# Patient Record
Sex: MALE | Race: ASIAN | NOT HISPANIC OR LATINO | URBAN - METROPOLITAN AREA
[De-identification: names, ages, dates, MRNs, and addresses within clinical notes are randomized per-mention and may not be internally consistent; named-entity substitution may affect disease eponyms.]

---

## 2024-01-07 ENCOUNTER — INPATIENT (INPATIENT)
Facility: HOSPITAL | Age: 39
LOS: 0 days | Discharge: LEFT BEFORE DISCHARGE | End: 2024-01-08
Attending: STUDENT IN AN ORGANIZED HEALTH CARE EDUCATION/TRAINING PROGRAM | Admitting: STUDENT IN AN ORGANIZED HEALTH CARE EDUCATION/TRAINING PROGRAM
Payer: COMMERCIAL

## 2024-01-07 VITALS
WEIGHT: 147.05 LBS | RESPIRATION RATE: 17 BRPM | DIASTOLIC BLOOD PRESSURE: 68 MMHG | TEMPERATURE: 98 F | OXYGEN SATURATION: 96 % | SYSTOLIC BLOOD PRESSURE: 101 MMHG | HEIGHT: 65 IN | HEART RATE: 98 BPM

## 2024-01-07 PROCEDURE — 99285 EMERGENCY DEPT VISIT HI MDM: CPT | Mod: 25

## 2024-01-07 PROCEDURE — 93010 ELECTROCARDIOGRAM REPORT: CPT

## 2024-01-08 ENCOUNTER — EMERGENCY (EMERGENCY)
Facility: HOSPITAL | Age: 39
LOS: 1 days | Discharge: HOME | End: 2024-01-08
Attending: EMERGENCY MEDICINE
Payer: COMMERCIAL

## 2024-01-08 VITALS
OXYGEN SATURATION: 98 % | RESPIRATION RATE: 18 BRPM | DIASTOLIC BLOOD PRESSURE: 89 MMHG | SYSTOLIC BLOOD PRESSURE: 130 MMHG | TEMPERATURE: 99 F | HEART RATE: 87 BPM

## 2024-01-08 VITALS
HEART RATE: 89 BPM | TEMPERATURE: 99 F | DIASTOLIC BLOOD PRESSURE: 88 MMHG | OXYGEN SATURATION: 99 % | WEIGHT: 149.91 LBS | HEIGHT: 67 IN | SYSTOLIC BLOOD PRESSURE: 132 MMHG | RESPIRATION RATE: 18 BRPM

## 2024-01-08 DIAGNOSIS — E11.9 TYPE 2 DIABETES MELLITUS WITHOUT COMPLICATIONS: ICD-10-CM

## 2024-01-08 DIAGNOSIS — S22.39XA FRACTURE OF ONE RIB, UNSPECIFIED SIDE, INITIAL ENCOUNTER FOR CLOSED FRACTURE: ICD-10-CM

## 2024-01-08 DIAGNOSIS — R55 SYNCOPE AND COLLAPSE: ICD-10-CM

## 2024-01-08 DIAGNOSIS — J93.9 PNEUMOTHORAX, UNSPECIFIED: ICD-10-CM

## 2024-01-08 LAB
ALBUMIN SERPL ELPH-MCNC: 4.1 G/DL — SIGNIFICANT CHANGE UP (ref 3.3–5)
ALBUMIN SERPL ELPH-MCNC: 4.1 G/DL — SIGNIFICANT CHANGE UP (ref 3.3–5)
ALP SERPL-CCNC: 63 U/L — SIGNIFICANT CHANGE UP (ref 40–120)
ALP SERPL-CCNC: 63 U/L — SIGNIFICANT CHANGE UP (ref 40–120)
ALT FLD-CCNC: 29 U/L — SIGNIFICANT CHANGE UP (ref 12–78)
ALT FLD-CCNC: 29 U/L — SIGNIFICANT CHANGE UP (ref 12–78)
ANION GAP SERPL CALC-SCNC: 6 MMOL/L — SIGNIFICANT CHANGE UP (ref 5–17)
ANION GAP SERPL CALC-SCNC: 6 MMOL/L — SIGNIFICANT CHANGE UP (ref 5–17)
AST SERPL-CCNC: 25 U/L — SIGNIFICANT CHANGE UP (ref 15–37)
AST SERPL-CCNC: 25 U/L — SIGNIFICANT CHANGE UP (ref 15–37)
BASOPHILS # BLD AUTO: 0.06 K/UL — SIGNIFICANT CHANGE UP (ref 0–0.2)
BASOPHILS # BLD AUTO: 0.06 K/UL — SIGNIFICANT CHANGE UP (ref 0–0.2)
BASOPHILS NFR BLD AUTO: 0.5 % — SIGNIFICANT CHANGE UP (ref 0–2)
BASOPHILS NFR BLD AUTO: 0.5 % — SIGNIFICANT CHANGE UP (ref 0–2)
BILIRUB SERPL-MCNC: 0.3 MG/DL — SIGNIFICANT CHANGE UP (ref 0.2–1.2)
BILIRUB SERPL-MCNC: 0.3 MG/DL — SIGNIFICANT CHANGE UP (ref 0.2–1.2)
BUN SERPL-MCNC: 19 MG/DL — SIGNIFICANT CHANGE UP (ref 7–23)
BUN SERPL-MCNC: 19 MG/DL — SIGNIFICANT CHANGE UP (ref 7–23)
CALCIUM SERPL-MCNC: 9.1 MG/DL — SIGNIFICANT CHANGE UP (ref 8.5–10.1)
CALCIUM SERPL-MCNC: 9.1 MG/DL — SIGNIFICANT CHANGE UP (ref 8.5–10.1)
CHLORIDE SERPL-SCNC: 107 MMOL/L — SIGNIFICANT CHANGE UP (ref 96–108)
CHLORIDE SERPL-SCNC: 107 MMOL/L — SIGNIFICANT CHANGE UP (ref 96–108)
CO2 SERPL-SCNC: 27 MMOL/L — SIGNIFICANT CHANGE UP (ref 22–31)
CO2 SERPL-SCNC: 27 MMOL/L — SIGNIFICANT CHANGE UP (ref 22–31)
CREAT SERPL-MCNC: 0.93 MG/DL — SIGNIFICANT CHANGE UP (ref 0.5–1.3)
CREAT SERPL-MCNC: 0.93 MG/DL — SIGNIFICANT CHANGE UP (ref 0.5–1.3)
EGFR: 107 ML/MIN/1.73M2 — SIGNIFICANT CHANGE UP
EGFR: 107 ML/MIN/1.73M2 — SIGNIFICANT CHANGE UP
EOSINOPHIL # BLD AUTO: 0.08 K/UL — SIGNIFICANT CHANGE UP (ref 0–0.5)
EOSINOPHIL # BLD AUTO: 0.08 K/UL — SIGNIFICANT CHANGE UP (ref 0–0.5)
EOSINOPHIL NFR BLD AUTO: 0.7 % — SIGNIFICANT CHANGE UP (ref 0–6)
EOSINOPHIL NFR BLD AUTO: 0.7 % — SIGNIFICANT CHANGE UP (ref 0–6)
GLUCOSE SERPL-MCNC: 144 MG/DL — HIGH (ref 70–99)
GLUCOSE SERPL-MCNC: 144 MG/DL — HIGH (ref 70–99)
HCT VFR BLD CALC: 43 % — SIGNIFICANT CHANGE UP (ref 39–50)
HCT VFR BLD CALC: 43 % — SIGNIFICANT CHANGE UP (ref 39–50)
HGB BLD-MCNC: 14.3 G/DL — SIGNIFICANT CHANGE UP (ref 13–17)
HGB BLD-MCNC: 14.3 G/DL — SIGNIFICANT CHANGE UP (ref 13–17)
IMM GRANULOCYTES NFR BLD AUTO: 0.4 % — SIGNIFICANT CHANGE UP (ref 0–0.9)
IMM GRANULOCYTES NFR BLD AUTO: 0.4 % — SIGNIFICANT CHANGE UP (ref 0–0.9)
LYMPHOCYTES # BLD AUTO: 2.28 K/UL — SIGNIFICANT CHANGE UP (ref 1–3.3)
LYMPHOCYTES # BLD AUTO: 2.28 K/UL — SIGNIFICANT CHANGE UP (ref 1–3.3)
LYMPHOCYTES # BLD AUTO: 20.5 % — SIGNIFICANT CHANGE UP (ref 13–44)
LYMPHOCYTES # BLD AUTO: 20.5 % — SIGNIFICANT CHANGE UP (ref 13–44)
MAGNESIUM SERPL-MCNC: 2.3 MG/DL — SIGNIFICANT CHANGE UP (ref 1.6–2.6)
MAGNESIUM SERPL-MCNC: 2.3 MG/DL — SIGNIFICANT CHANGE UP (ref 1.6–2.6)
MCHC RBC-ENTMCNC: 29.7 PG — SIGNIFICANT CHANGE UP (ref 27–34)
MCHC RBC-ENTMCNC: 29.7 PG — SIGNIFICANT CHANGE UP (ref 27–34)
MCHC RBC-ENTMCNC: 33.3 G/DL — SIGNIFICANT CHANGE UP (ref 32–36)
MCHC RBC-ENTMCNC: 33.3 G/DL — SIGNIFICANT CHANGE UP (ref 32–36)
MCV RBC AUTO: 89.2 FL — SIGNIFICANT CHANGE UP (ref 80–100)
MCV RBC AUTO: 89.2 FL — SIGNIFICANT CHANGE UP (ref 80–100)
MONOCYTES # BLD AUTO: 0.4 K/UL — SIGNIFICANT CHANGE UP (ref 0–0.9)
MONOCYTES # BLD AUTO: 0.4 K/UL — SIGNIFICANT CHANGE UP (ref 0–0.9)
MONOCYTES NFR BLD AUTO: 3.6 % — SIGNIFICANT CHANGE UP (ref 2–14)
MONOCYTES NFR BLD AUTO: 3.6 % — SIGNIFICANT CHANGE UP (ref 2–14)
NEUTROPHILS # BLD AUTO: 8.24 K/UL — HIGH (ref 1.8–7.4)
NEUTROPHILS # BLD AUTO: 8.24 K/UL — HIGH (ref 1.8–7.4)
NEUTROPHILS NFR BLD AUTO: 74.3 % — SIGNIFICANT CHANGE UP (ref 43–77)
NEUTROPHILS NFR BLD AUTO: 74.3 % — SIGNIFICANT CHANGE UP (ref 43–77)
NRBC # BLD: 0 /100 WBCS — SIGNIFICANT CHANGE UP (ref 0–0)
NRBC # BLD: 0 /100 WBCS — SIGNIFICANT CHANGE UP (ref 0–0)
PLATELET # BLD AUTO: 246 K/UL — SIGNIFICANT CHANGE UP (ref 150–400)
PLATELET # BLD AUTO: 246 K/UL — SIGNIFICANT CHANGE UP (ref 150–400)
POTASSIUM SERPL-MCNC: 3.7 MMOL/L — SIGNIFICANT CHANGE UP (ref 3.5–5.3)
POTASSIUM SERPL-MCNC: 3.7 MMOL/L — SIGNIFICANT CHANGE UP (ref 3.5–5.3)
POTASSIUM SERPL-SCNC: 3.7 MMOL/L — SIGNIFICANT CHANGE UP (ref 3.5–5.3)
POTASSIUM SERPL-SCNC: 3.7 MMOL/L — SIGNIFICANT CHANGE UP (ref 3.5–5.3)
PROT SERPL-MCNC: 8.2 GM/DL — SIGNIFICANT CHANGE UP (ref 6–8.3)
PROT SERPL-MCNC: 8.2 GM/DL — SIGNIFICANT CHANGE UP (ref 6–8.3)
RBC # BLD: 4.82 M/UL — SIGNIFICANT CHANGE UP (ref 4.2–5.8)
RBC # BLD: 4.82 M/UL — SIGNIFICANT CHANGE UP (ref 4.2–5.8)
RBC # FLD: 13.4 % — SIGNIFICANT CHANGE UP (ref 10.3–14.5)
RBC # FLD: 13.4 % — SIGNIFICANT CHANGE UP (ref 10.3–14.5)
SODIUM SERPL-SCNC: 140 MMOL/L — SIGNIFICANT CHANGE UP (ref 135–145)
SODIUM SERPL-SCNC: 140 MMOL/L — SIGNIFICANT CHANGE UP (ref 135–145)
TROPONIN I, HIGH SENSITIVITY RESULT: 3.8 NG/L — SIGNIFICANT CHANGE UP
TROPONIN I, HIGH SENSITIVITY RESULT: 3.8 NG/L — SIGNIFICANT CHANGE UP
WBC # BLD: 11.1 K/UL — HIGH (ref 3.8–10.5)
WBC # BLD: 11.1 K/UL — HIGH (ref 3.8–10.5)
WBC # FLD AUTO: 11.1 K/UL — HIGH (ref 3.8–10.5)
WBC # FLD AUTO: 11.1 K/UL — HIGH (ref 3.8–10.5)

## 2024-01-08 PROCEDURE — 71046 X-RAY EXAM CHEST 2 VIEWS: CPT | Mod: 26

## 2024-01-08 PROCEDURE — 72125 CT NECK SPINE W/O DYE: CPT | Mod: 26,MA

## 2024-01-08 PROCEDURE — 71260 CT THORAX DX C+: CPT | Mod: 26,MA

## 2024-01-08 PROCEDURE — 70450 CT HEAD/BRAIN W/O DYE: CPT | Mod: 26,MA

## 2024-01-08 PROCEDURE — 71046 X-RAY EXAM CHEST 2 VIEWS: CPT

## 2024-01-08 PROCEDURE — 99284 EMERGENCY DEPT VISIT MOD MDM: CPT

## 2024-01-08 PROCEDURE — 99284 EMERGENCY DEPT VISIT MOD MDM: CPT | Mod: 25

## 2024-01-08 PROCEDURE — 71045 X-RAY EXAM CHEST 1 VIEW: CPT | Mod: 26

## 2024-01-08 PROCEDURE — 93880 EXTRACRANIAL BILAT STUDY: CPT | Mod: 26

## 2024-01-08 PROCEDURE — 99223 1ST HOSP IP/OBS HIGH 75: CPT

## 2024-01-08 RX ORDER — ONDANSETRON 8 MG/1
4 TABLET, FILM COATED ORAL EVERY 8 HOURS
Refills: 0 | Status: DISCONTINUED | OUTPATIENT
Start: 2024-01-08 | End: 2024-01-08

## 2024-01-08 RX ORDER — CYCLOBENZAPRINE HYDROCHLORIDE 10 MG/1
5 TABLET, FILM COATED ORAL THREE TIMES A DAY
Refills: 0 | Status: DISCONTINUED | OUTPATIENT
Start: 2024-01-08 | End: 2024-01-08

## 2024-01-08 RX ORDER — LANOLIN ALCOHOL/MO/W.PET/CERES
3 CREAM (GRAM) TOPICAL AT BEDTIME
Refills: 0 | Status: DISCONTINUED | OUTPATIENT
Start: 2024-01-08 | End: 2024-01-08

## 2024-01-08 RX ORDER — LIDOCAINE 4 G/100G
1 CREAM TOPICAL ONCE
Refills: 0 | Status: COMPLETED | OUTPATIENT
Start: 2024-01-08 | End: 2024-01-08

## 2024-01-08 RX ORDER — METHOCARBAMOL 500 MG/1
1500 TABLET, FILM COATED ORAL ONCE
Refills: 0 | Status: COMPLETED | OUTPATIENT
Start: 2024-01-08 | End: 2024-01-08

## 2024-01-08 RX ORDER — ACETAMINOPHEN 500 MG
650 TABLET ORAL EVERY 6 HOURS
Refills: 0 | Status: DISCONTINUED | OUTPATIENT
Start: 2024-01-08 | End: 2024-01-08

## 2024-01-08 RX ORDER — MORPHINE SULFATE 50 MG/1
2 CAPSULE, EXTENDED RELEASE ORAL ONCE
Refills: 0 | Status: DISCONTINUED | OUTPATIENT
Start: 2024-01-08 | End: 2024-01-08

## 2024-01-08 RX ORDER — ACETAMINOPHEN 500 MG
975 TABLET ORAL ONCE
Refills: 0 | Status: COMPLETED | OUTPATIENT
Start: 2024-01-08 | End: 2024-01-08

## 2024-01-08 RX ORDER — KETOROLAC TROMETHAMINE 30 MG/ML
30 SYRINGE (ML) INJECTION EVERY 6 HOURS
Refills: 0 | Status: DISCONTINUED | OUTPATIENT
Start: 2024-01-08 | End: 2024-01-08

## 2024-01-08 RX ADMIN — Medication 30 MILLIGRAM(S): at 15:44

## 2024-01-08 RX ADMIN — Medication 975 MILLIGRAM(S): at 02:13

## 2024-01-08 RX ADMIN — Medication 30 MILLIGRAM(S): at 04:17

## 2024-01-08 RX ADMIN — LIDOCAINE 1 PATCH: 4 CREAM TOPICAL at 22:20

## 2024-01-08 RX ADMIN — METHOCARBAMOL 1500 MILLIGRAM(S): 500 TABLET, FILM COATED ORAL at 01:13

## 2024-01-08 RX ADMIN — Medication 975 MILLIGRAM(S): at 01:13

## 2024-01-08 RX ADMIN — Medication 30 MILLIGRAM(S): at 11:53

## 2024-01-08 NOTE — ED PROVIDER NOTE - PHYSICAL EXAMINATION
Vitals: WNL  Gen: AAOx3, NAD, sitting uncomfortably in stretcher, non-toxic   Head: ncat, perrla, eomi b/l  Neck: supple, no lymphadenopathy, no midline deviation  Heart: rrr, no m/r/g  Lungs: CTA b/l, no rales/ronchi/wheezes, + TTP over left anterolateral ribs, hurts with deep breath, normal rom of L shoulder albeit with pain, no gross bony deformities or skin tenting, clavicle is non-tender without deformities   Abd: soft, nontender, non-distended, no rebound or guarding  Ext: no clubbing/cyanosis/edema  Neuro: sensation and muscle strength intact b/l, no focal weakness or sensory loss, CN2-12 intact b/l   derm: no skin breaks/rash of chest/LUE

## 2024-01-08 NOTE — ED PROVIDER NOTE - NSFOLLOWUPINSTRUCTIONS_ED_ALL_ED_FT
You were seen in the ED today after a fall from the stairs.    You received a chest x-ray today.  Your results are attached.    You were also evaluated by surgery.    You can use 500-1000mg Tylenol every 6 hours for pain - as needed.  This is an over-the-counter medications - please respect the warnings on the label. This medication come with certain risks and side effects that you need to discuss with your doctor, especially if you are taking it for a prolonged period.    Please buy Salonpas Lidocaine patches over the counter at the pharmacy. Use as directed on packaging.    -- Even though it may be painful, it is important that you frequently expand your lungs fully to avoid pneumonia and other complications.  -- We've provided you an incentive spirometer.  It is very important that you take 10 slow deep breaths every hour while you are awake.      If you develop fevers, difficulty breathing, worsening pain not controlled at home, pass out, or new or worsening symptoms then return to the ED.

## 2024-01-08 NOTE — ED ADULT NURSE NOTE - NSFALLUNIVINTERV_ED_ALL_ED
Bed/Stretcher in lowest position, wheels locked, appropriate side rails in place/Call bell, personal items and telephone in reach/Instruct patient to call for assistance before getting out of bed/chair/stretcher/Non-slip footwear applied when patient is off stretcher/[place] to call system/Physically safe environment - no spills, clutter or unnecessary equipment/Purposeful proactive rounding/Room/bathroom lighting operational, light cord in reach Bed/Stretcher in lowest position, wheels locked, appropriate side rails in place/Call bell, personal items and telephone in reach/Instruct patient to call for assistance before getting out of bed/chair/stretcher/Non-slip footwear applied when patient is off stretcher/[place] to call system/Physically safe environment - no spills, clutter or unnecessary equipment/Purposeful proactive rounding/Room/bathroom lighting operational, light cord in reach

## 2024-01-08 NOTE — ED PROVIDER NOTE - PHYSICAL EXAMINATION
GEN: NAD. A&Ox3. Non-toxic appearing.  HEENT: normocephalic, atraumatic, EOMI, PERRL, no scleral icterus, no conjuntival pallor, moist MM  CARDIAC: RRR, S1, S2, no murmur. Radial pulses present and symmetric b/l  PULM: CTA B/L no wheeze, rhonchi, rales, left lateral 4th rib chest wall ttp  ABD: soft NT, ND, no rebound no guarding  MSK: No midline ttp, moving all extremities, no edema. 5/5 strength and full ROM in all extremities.     NEURO: gait normal, no focal neurological deficits, CN 2-12 grossly intact  SKIN: warm, dry, no rash.

## 2024-01-08 NOTE — ED PROVIDER NOTE - PATIENT PORTAL LINK FT
You can access the FollowMyHealth Patient Portal offered by [facility] by registering at the following website: [email or web address]. By joining [employer]’s FollowMyHealth portal, you will also be able to view your health information using other applications (apps) compatible with our system. You can access the FollowMyHealth Patient Portal offered by [facility] by registering at the following website: [email or web address]. By joining [employer]’s FollowMyHealth portal, you will also be able to view your health information using other applications (apps) compatible with our system.

## 2024-01-08 NOTE — ED PROVIDER NOTE - CARE PLAN
1 Principal Discharge DX:	Traumatic closed fracture of one rib with minimal displacement, left, initial encounter

## 2024-01-08 NOTE — ED PROVIDER NOTE - PROGRESS NOTE DETAILS
rg attending- Patient signed out to me pending disposition pending on trauma surgery evaluation discussed with trauma surgery recommended discharge follow-up as needed with PMD

## 2024-01-08 NOTE — ED ADULT NURSE REASSESSMENT NOTE - NS ED NURSE REASSESS COMMENT FT1
No inpatient bed assignment available, patient updated on POC and expresses wish to leave hospital. Patient denies pain, CP or dizziness. Risks of leaving given and understood, MINOO York came down to ER and AMA paperwork was signed. PRN adapter removed intact, patient ambulating with steady gait. Patient ambulated out of ED for private care ride home from friend without difficulty.

## 2024-01-08 NOTE — H&P ADULT - ASSESSMENT
38 year old male with a PMH of DM presents to the ED for syncopal episode.  Endorses he was heading to the basement and fell down the steps due to a syncopal episode, Unable to recall exactly what happened, but remembers holding his phone walking  up the steps then found himself in the car being driven to the ED.  Unsure if head-strike occurred, but believes he fell down about 7-9 steps. No preceding factors, No previous episodes. Upon evaluation endorses L-sided chest pain worse with deep inspiration and L- shoulder pain. Denies headache, dizziness, SOB, palpitations, N/V/D abd pain or any other complaints. In the ED found to Rib fractures and Small L-pneumothorax. Labs unremarkable, Vitals stable.   CT-chest: Acute left posterior fourth rib fracture (in 2 spots), and nondisplaced left posterior fifth rib fracture. Tiny left pneumothorax. Trace left pleural effusion. Small focal nodular opacity in the left upper lobe peripherally underlying the rib fractures suggestive of a pulmonary contusion.  CT BRAIN: No acute intracranial hemorrhage, mass effect or acute calvarium fracture. CERVICAL SPINE CT: No acute cervical spine fracture, subluxation or evidence of traumatic malalignment.

## 2024-01-08 NOTE — H&P ADULT - NSHPLABSRESULTS_GEN_ALL_CORE
14.3   11.10 )-----------( 246      ( 08 Jan 2024 01:05 )             43.0     140  |  107  |  19  ----------------------------<  144<H>     01-08  3.7   |  27  |  0.93    Ca    9.1      08 Jan 2024 01:05  Mg     2.3     01-08    TPro  8.2  /  Alb  4.1  /  TBili  0.3  /  DBili  x   /  AST  25  /  ALT  29  /  AlkPhos  63  01-08      CT BRAIN: No acute intracranial hemorrhage, mass effect or acute calvarium fracture.    CERVICAL SPINE CT: No acute cervical spine fracture, subluxation or evidence of traumatic malalignment.      CT-chest   LUNGS AND AIRWAYS: Patent central airways. Mild left basilar subsegmental atelectasis. Small nodular opacity in the periphery of the left upper lobe seen on 3:55.  PLEURA: Trace left pleural effusion and tiny left pneumothorax.  MEDIASTINUM AND JEANE: No lymphadenopathy.  VESSELS: Within normal limits.  HEART: Heart size is normal. No pericardial effusion.  CHEST WALL AND LOWER NECK: Within normal limits.  VISUALIZED UPPER ABDOMEN: Too small to characterize right renal hypodensity.  BONES: Acute linear nondisplaced fracture of the left posterior fourth rib and additional displaced and overlapping fracture of the left fourth rib more laterally with adjacent tiny focus of gas in the chest wall. Moreover, linear nondisplaced left posterior fifth rib fracture.    IMPRESSION:  Acute left posterior fourth rib fracture (in 2 spots), and nondisplaced left posterior fifth rib fracture.    Tiny left pneumothorax. Trace left pleural effusion.    Small focal nodular opacity in the left upper lobe peripherally underlying the rib fractures suggestive of a pulmonary contusion.

## 2024-01-08 NOTE — H&P ADULT - PROBLEM SELECTOR PLAN 1
1 episode of syncopal episode without preceding factor or post-ictal state   No previous history  CT BRAIN: No acute intracranial hemorrhage, mass effect or acute calvarium fracture.   - Tele  - Fall precaution   - Orthostatic vitals  - Neuro-checks Q4hrs   - Echo   - Carotid doppler   - DVT Prophylaxis   - F/u labs

## 2024-01-08 NOTE — CHART NOTE - NSCHARTNOTEFT_GEN_A_CORE
Medicine Hospitalist PA    Called by RN that patient would like to leave AMA. Patient was seen at the bedside to discuss the risks of leaving against medical advice, with many attempts made to reason to remain in the hospital to be safely discharged. Patient is A&Ox4  and has full capacity. Patient  would still like to leave AMA and was made aware of the consequences of leaving AMA, including worsening of medical condition and death. Patient verbalized understanding and signed AMA form, witnessed by MISSY Scherer at 1715. Discussed with Dr. Knapp , who is in agreement.

## 2024-01-08 NOTE — ED ADULT NURSE REASSESSMENT NOTE - NS ED NURSE REASSESS COMMENT FT1
Patient received awake and alert, VSS, patient on remote tele box. Plan of care explained, teaching given and understood. Patient sent for ultrasound. Comfort measures provided.

## 2024-01-08 NOTE — CHART NOTE - NSCHARTNOTEFT_GEN_A_CORE
Work Letter     To Whom It May Concern,    Mr Thompson Peña was admitted on 01/07/24 and was discharged from [facility] on 1/8/24. Patient may return to work with further clearance from PCP if required.   Any further questions or concerns please use contact info below.      Jaylen Stoner PA-C  Internal Medicine  [address] [place] [id number]  [phone] Work Letter     To Whom It May Concern,    Mr Thompson Peña was admitted on 01/07/24 and was discharged from [facility] on 1/8/24. Patient may return to work with further clearance from PCP if required.   Any further questions or concerns please use contact info below.      Jaylen Stoner PA-C  Internal Medicine  [address] [place] [id number]  [phone]

## 2024-01-08 NOTE — ED PROVIDER NOTE - CLINICAL SUMMARY MEDICAL DECISION MAKING FREE TEXT BOX
Other MRN [id number]  38-year-old male history of diabetes presents after mechanical fall down 6-7 steps last night.  Patient denies hitting head, no LOC, endorsed pain to left lateral ribs.  Patient went to [place] and found to have acute left posterior fourth rib fracture at 2 spots and a nondisplaced left posterior 5th rib fracture with a small left pneumothorax and left pleural effusion.  Continues to endorse pain to left lateral ribs.  Patient was admitted but left AMA and came here for pain medication. Denies headaches, vision changes, neck pain, shortness of breath, abdominal pain, nausea, vomiting.   Per chart review outside [facility] states patient had syncopal episode but denies at this time.  Afebrile, not tachycardic, not hypoxic, no midline tenderness, no ecchymosis over chest, left lateral rib tenderness to palpation at approximately fourth rib.  IS 2L. Will repeat chest x-ray to evaluate pneumothorax, discussed with trauma, reassess. Other MRN [id number]  38-year-old male history of diabetes presents after mechanical fall down 6-7 steps last night.  Patient denies hitting head, no LOC, endorsed pain to left lateral ribs.  Patient went to [place] and found to have acute left posterior fourth rib fracture at 2 spots and a nondisplaced left posterior 5th rib fracture with a small left pneumothorax and left pleural effusion.  Continues to endorse pain to left lateral ribs.  Patient was admitted but left AMA and came here for pain medication. Denies headaches, vision changes, neck pain, shortness of breath, abdominal pain, nausea, vomiting.   Per chart review outside [facility] states patient had syncopal episode but denies at this time.  Afebrile, not tachycardic, not hypoxic, no midline tenderness, no ecchymosis over chest, left lateral rib tenderness to palpation at approximately fourth rib.  IS 2L. Will repeat chest x-ray to evaluate pneumothorax, discussed with trauma, reassess.

## 2024-01-08 NOTE — ED PROVIDER NOTE - ATTENDING CONTRIBUTION TO CARE
Attending MD Crowley: I personally have seen and examined this patient.  Resident note reviewed and agree on plan of care and except where noted.  See below for details.     Seen in [place], accompanied by loved one  AUTUMN MRN: [id number]    38M with PMH/PSH including DM, no known drug allergies presents to the ED requesting an Echo.  Reports that he fell down about 7 steps at around 7-8pm on 1/7/24.  Reports was holding something so could not grab railing.  Reports landed with his LUE over his head hitting his L side of chest.  Reports that his brother in law was with him.  Denies LOC.  Reports he then presented to [facility].  Reports while there he thinks he told someone while he was in pain and not paying attention that he had LOC but vehemently denies at present.  Review of EMR reveals CTH without ICH, CT C spine without fracture, subluxation or traumatic malalignment, CT Chest with acute L posterior 4th rib fracture (in 2 spots), and nondisplaced L posterior 5th rib fx.  Tiny L PTX.  Trace L pleural effusion.  Small focal nodular opacity in the L upper lobe peripherally underlying the rib fracture suggestive of a pulmonary contusion.  Carotid US no significant hemodynamic stenosis of either carotid.      Here patient reports that he was sitting in the marks for 18 hours and was told that he would not be having an echo until Wednesday and so he AMA'ed.  Presents here requesting Echo he was told he needed at [facility].  Denies previous syncopal episode.  Denies dizziness, lightheadedness, severe headache, change in vision, double vision, sudden loss of vision since initial fall.  Reports persistent L sided chest pain, worse with deep inspiration, reports localized over areas of known fractures.  Denies abdominal pain, nausea, vomiting, diarrhea, bloody or black stools.  Denies loss of urinary or bowel continence. Denies numbness, weakness or tingling in extremities.  Denies urinary complaints including hematuria.  Denies tobacco, drugs, EtOH.    Trauma Assessment:  A - airway patent, speaking clearly  B - symmetrical chest rise, no increased work of breathing, breath sounds bilaterally  C - no active bleeding, skin warm and dry  D - GCS 15, PERRL  E - exposed     Exam:   General: NAD  HENT: head NCAT, airway patent  Eyes: anicteric, no conjunctival injection   Lungs: lungs CTAB with good inspiratory effort, no wheezing, no rhonchi, no rales, no decreased breath sounds noted, pulling 2L on incentive spirometer, no increased work of breathing, speaking in full sentences  Chest: +S1S2, no obvious m/r/g, +tenderness to palpation to L side of chest around level of 4th-5th ribs, no crepitus, no flail chest, no ecchymosis  GI: abdomen soft with +BS, NT, ND  : no CVAT  MSK: ranging neck and extremities freely, no tenderness to palpation of midline spine  Neuro: moving all extremities spontaneously, nonfocal  Psych: normal mood and affect     A/P: 38M with known 4th-5th rib fractures from reported trauma ~24 hrs ago, small PTX with no increased work of braething Attending MD Crowley: I personally have seen and examined this patient.  Resident note reviewed and agree on plan of care and except where noted.  See below for details.     Seen in [place], accompanied by loved one  AUTUMN MRN: [id number]    38M with PMH/PSH including DM, no known drug allergies presents to the ED requesting an Echo.  Reports that he fell down about 7 steps at around 7-8pm on 1/7/24.  Reports was holding something so could not grab railing.  Reports landed with his LUE over his head hitting his L side of chest.  Reports that his brother in law was with him.  Denies LOC.  Reports he then presented to [facility].  Reports while there he thinks he told someone while he was in pain and not paying attention that he had LOC but vehemently denies at present.  Review of EMR reveals CTH without ICH, CT C spine without fracture, subluxation or traumatic malalignment, CT Chest with acute L posterior 4th rib fracture (in 2 spots), and nondisplaced L posterior 5th rib fx.  Tiny L PTX.  Trace L pleural effusion.  Small focal nodular opacity in the L upper lobe peripherally underlying the rib fracture suggestive of a pulmonary contusion.  Carotid US no significant hemodynamic stenosis of either carotid.      Here patient reports that he was sitting in the marks for 18 hours and was told that he would not be having an echo until Wednesday and so he AMA'ed.  Presents here requesting Echo he was told he needed at [facility].  Denies previous syncopal episode.  Denies dizziness, lightheadedness, severe headache, change in vision, double vision, sudden loss of vision since initial fall.  Reports persistent L sided chest pain, worse with deep inspiration, reports localized over areas of known fractures.  Denies abdominal pain, nausea, vomiting, diarrhea, bloody or black stools.  Denies loss of urinary or bowel continence. Denies numbness, weakness or tingling in extremities.  Denies urinary complaints including hematuria.  Denies tobacco, drugs, EtOH.    Trauma Assessment:  A - airway patent, speaking clearly  B - symmetrical chest rise, no increased work of breathing, breath sounds bilaterally  C - no active bleeding, skin warm and dry  D - GCS 15, PERRL  E - exposed     Exam:   General: NAD  HENT: head NCAT, airway patent  Eyes: anicteric, no conjunctival injection   Lungs: lungs CTAB with good inspiratory effort, no wheezing, no rhonchi, no rales, no decreased breath sounds noted, pulling 2L on incentive spirometer, no increased work of breathing, speaking in full sentences  Chest: +S1S2, no obvious m/r/g, +tenderness to palpation to L side of chest around level of 4th-5th ribs, no crepitus, no flail chest, no ecchymosis  GI: abdomen soft with +BS, NT, ND  : no CVAT  MSK: ranging neck and extremities freely, no tenderness to palpation of midline spine  Neuro: moving all extremities spontaneously, nonfocal  Psych: normal mood and affect     A/P: 38M with known 4th-5th rib fractures from reported trauma ~24 hrs ago, small PTX with no increased work of braething Attending MD Crowley: I personally have seen and examined this patient.  Resident note reviewed and agree on plan of care and except where noted.  See below for details.     Seen in [place], accompanied by loved one  AUTUMN MRN: [id number]    38M with PMH/PSH including DM, no known drug allergies presents to the ED requesting an Echo.  Reports that he fell down about 7 steps at around 7-8pm on 1/7/24.  Reports was holding something so could not grab railing.  Reports landed with his LUE over his head hitting his L side of chest.  Reports that his brother in law was with him.  Denies LOC.  Reports he then presented to [facility].  Reports while there he thinks he told someone while he was in pain and not paying attention that he had LOC but vehemently denies at present.  Review of EMR reveals CTH without ICH, CT C spine without fracture, subluxation or traumatic malalignment, CT Chest with acute L posterior 4th rib fracture (in 2 spots), and nondisplaced L posterior 5th rib fx.  Tiny L PTX.  Trace L pleural effusion.  Small focal nodular opacity in the L upper lobe peripherally underlying the rib fracture suggestive of a pulmonary contusion.  Carotid US no significant hemodynamic stenosis of either carotid.      Here patient reports that he was sitting in the marks for 18 hours and was told that he would not be having an echo until Wednesday and so he AMA'ed.  Presents here requesting Echo he was told he needed at [facility].  Denies previous syncopal episode.  Denies dizziness, lightheadedness, severe headache, change in vision, double vision, sudden loss of vision since initial fall.  Reports persistent L sided chest pain, worse with deep inspiration, reports localized over areas of known fractures.  Denies abdominal pain, nausea, vomiting, diarrhea, bloody or black stools.  Denies loss of urinary or bowel continence. Denies numbness, weakness or tingling in extremities.  Denies urinary complaints including hematuria.  Denies tobacco, drugs, EtOH.    Trauma Assessment:  A - airway patent, speaking clearly  B - symmetrical chest rise, no increased work of breathing, breath sounds bilaterally  C - no active bleeding, skin warm and dry  D - GCS 15, PERRL  E - exposed     Exam:   General: NAD  HENT: head NCAT, airway patent  Eyes: anicteric, no conjunctival injection   Lungs: lungs CTAB with good inspiratory effort, no wheezing, no rhonchi, no rales, no decreased breath sounds noted, pulling 2L on incentive spirometer, no increased work of breathing, speaking in full sentences  Chest: +S1S2, no obvious m/r/g, +tenderness to palpation to L side of chest around level of 4th-5th ribs, no crepitus, no flail chest, no ecchymosis  GI: abdomen soft with +BS, NT, ND  : no CVAT  MSK: ranging neck and extremities freely, no tenderness to palpation of midline spine  Neuro: moving all extremities spontaneously, nonfocal  Psych: normal mood and affect     A/P: 38M with known 4th-5th rib fractures from reported trauma ~24 hrs ago, small PTX with no increased work of breathing, will repeat CXR AP/Lat to eval for PTX, will obtain trauma consult.  Discussed with family and patient that at this time no emergent Echo indicated.  Will await trauma input but can likely obtain outpatient echo. Attending MD Crowley: I personally have seen and examined this patient.  Resident note reviewed and agree on plan of care and except where noted.  See below for details.     Seen in [place] 35[place], accompanied by loved one  AUTUMN MRN: [id number]    38M with PMH/PSH including DM, no known drug allergies presents to the ED requesting an Echo.  Reports that he fell down about 7 steps at around 7-8pm on 1/7/24.  Reports was holding something so could not grab railing.  Reports landed with his LUE over his head hitting his L side of chest.  Reports that his brother in law was with him.  Denies LOC.  Reports he then presented to [place].  Reports while there he thinks he told someone while he was in pain and not paying attention that he had LOC but vehemently denies at present.  Review of EMR reveals CTH without ICH, CT C spine without fracture, subluxation or traumatic malalignment, CT Chest with acute L posterior 4th rib fracture (in 2 spots), and nondisplaced L posterior 5th rib fx.  Tiny L PTX.  Trace L pleural effusion.  Small focal nodular opacity in the L upper lobe peripherally underlying the rib fracture suggestive of a pulmonary contusion.  Carotid US no significant hemodynamic stenosis of either carotid.      Here patient reports that he was sitting in the marks for 18 hours and was told that he would not be having an echo until Wednesday and so he AMA'ed.  Presents here requesting Echo he was told he needed at [place].  Denies previous syncopal episode.  Denies dizziness, lightheadedness, severe headache, change in vision, double vision, sudden loss of vision since initial fall.  Reports persistent L sided chest pain, worse with deep inspiration, reports localized over areas of known fractures.  Denies abdominal pain, nausea, vomiting, diarrhea, bloody or black stools.  Denies loss of urinary or bowel continence. Denies numbness, weakness or tingling in extremities.  Denies urinary complaints including hematuria.  Denies tobacco, drugs, EtOH.    Trauma Assessment:  A - airway patent, speaking clearly  B - symmetrical chest rise, no increased work of breathing, breath sounds bilaterally  C - no active bleeding, skin warm and dry  D - GCS 15, PERRL  E - exposed     Exam:   General: NAD  HENT: head NCAT, airway patent  Eyes: anicteric, no conjunctival injection   Lungs: lungs CTAB with good inspiratory effort, no wheezing, no rhonchi, no rales, no decreased breath sounds noted, pulling 2L on incentive spirometer, no increased work of breathing, speaking in full sentences  Chest: +S1S2, no obvious m/r/g, +tenderness to palpation to L side of chest around level of 4th-5th ribs, no crepitus, no flail chest, no ecchymosis  GI: abdomen soft with +BS, NT, ND  : no CVAT  MSK: ranging neck and extremities freely, no tenderness to palpation of midline spine  Neuro: moving all extremities spontaneously, nonfocal  Psych: normal mood and affect     A/P: 38M with known 4th-5th rib fractures from reported trauma ~24 hrs ago, small PTX with no increased work of breathing, will repeat CXR AP/Lat to eval for PTX, will obtain trauma consult.  Discussed with family and patient that at this time no emergent Echo indicated.  Will await trauma input but can likely obtain outpatient echo.

## 2024-01-08 NOTE — ED ADULT NURSE NOTE - OBJECTIVE STATEMENT
Pt is a 38y M c/o L sided shoulder pain, L rib pain chest pain s/p single mechanical fall down 6 steps last night after missing a step. Pt denies head trauma, LOC. Pt states he fell down steps by accident, went to [facility], found to have multiple rib fx, left AMA, came to [facility] ED. Pt denies fever, chills, cough, ha, dizziness, NVD, abd pain. Pt is well appearing, A&Ox3, neuro status intact, breathing unlabored and spontaneous, MAEx4, abd nondistended. Pt resting in stretcher, bed in lowest position, family member at bedside, aware of plan of care. Comfort and safety measures maintained. Pt is a 38y M c/o L sided shoulder pain, L rib pain chest pain s/p single mechanical fall down 6 steps last night after missing a step. Pt denies head trauma, LOC. Pt states he fell down steps by accident, went to [facility], found to have multiple rib fx, left AMA, came to [facility] ED. Pt denies fever, chills, cough, ha, dizziness, NVD, abd pain. Pt is well appearing, A&Ox3, neuro status intact, breathing unlabored and spontaneous, MAEx4, abd nondistended. Pt resting in stretcher, bed in lowest position, family member at bedside, aware of plan of care. Comfort and safety measures maintained.

## 2024-01-08 NOTE — ED ADULT NURSE REASSESSMENT NOTE - NS ED NURSE REASSESS COMMENT FT1
Patient calm and cooperative, NAD noted. Awaiting inpatient bed assignment. POC updates given. Patient denies pain, 0/10. No CP or SOB.

## 2024-01-08 NOTE — ED PROVIDER NOTE - CLINICAL SUMMARY MEDICAL DECISION MAKING FREE TEXT BOX
39 yo M with L chest pain, L shoulder pain, fall down steps with likely syncopal event  -CT brain/cervical/chest, CXR, ekg, iv, tylenol/robaxin for pain, cbc, cmp, mag, trop, finger stick  -f/u results, reeval

## 2024-01-08 NOTE — H&P ADULT - PROBLEM SELECTOR PLAN 2
s/p fall  CT-chest: Acute left posterior fourth rib fracture (in 2 spots), and nondisplaced left posterior fifth rib fracture  - Pain control   - Muscle relaxer

## 2024-01-08 NOTE — ED ADULT NURSE NOTE - NS ED NURSE DISCH DISPOSITION
[facility]    Consult Note    Date:  3/22/2020  Date of Admission:  3/22/2020      Chief Complaint:   Delia Herndon is a(n) 75year old female with dyspnea.     HPI:   The patient has a history of congestive heart failure with preserved Former Smoker      Smokeless tobacco: Never Used    Alcohol use: Not Currently    Drug use: Never    Allergies/Medications:    Allergies:   Metformin               DIARRHEA  Tetracycline            RASH  (Not in a hospital admission)      Review of Systems: possible. Will await the pro calcitonin. Will cover for the possibility of pneumonia and will evaluate swallowing. Recommendations:  1. Await cardiology opinion  2. Gentle diuresis  3. Direct viral respiratory panel  4.   If the direct viral respira AMA (saw a physician/midlevel provider and clinician was able to provide reasons for staying for treatment & form is signed)

## 2024-01-08 NOTE — ED ADULT NURSE NOTE - OBJECTIVE STATEMENT
Pt is AOx3 c/o rib pain/injury. Pt is s/p fall down 3-4 steps, does not remember what caused fall. Pt is unsure of syncope or head strike. C/o severe 9/10 pain to L chest, ribs, shoulder. Pain exacerbated by even slight movement. Hx DM

## 2024-01-08 NOTE — ED ADULT NURSE NOTE - FINAL NURSING ELECTRONIC SIGNATURE
PICC Placement Note    PRE-PROCEDURE VERIFICATION  Correct Procedure: yes  Correct Site:  yes  Temperature: Temp: (!) 100.6 °F (38.1 °C), Temperature Source: Temp Source: Oral  Recent Labs     12/14/22  0227   BUN 5*   CREA 0.97      WBC 10.4     Allergies: Patient has no known allergies. PICC Placement Note    PRE-PROCEDURE VERIFICATION  Correct Procedure: yes  Correct Site:  yes  Temperature: Temp: (!) 100.6 °F (38.1 °C), Temperature Source: Temp Source: Oral  Recent Labs     12/14/22  0227   BUN 5*   CREA 0.97      WBC 10.4     Allergies: Patient has no known allergies. Education materials, including PICC Booklet, for PICC Care given to patient: yes. See Patient Education activity for further details. PROCEDURE DETAIL  A double lumen PICC line was started for TPN. The following documentation is in addition to the PICC properties in the lines/airways flowsheet :  Lot #: [id number]  Was xylocaine 1% used intradermally:  yes  Total catheter length: 42 (cm)  External catheter length:  1 (cm)  Vein Selection for PICC:right basilic  Central Line Bundle followed yes  Complication Related to Insertion: none    The placement was verified by ECG/Sapiens technology: The  tip location is in the superior vena cava. See ECG results for PICC tip placement. Report given to Kira Marie, [facility]. Line is okay to use.     Hossein Escobedo RN
Primary RN notified that pt is not appropriate for a PICC at this time d/t recent fever and pending blood cultures.
09-Jan-2024 01:14

## 2024-01-08 NOTE — H&P ADULT - PROBLEM SELECTOR PLAN 3
Tiny left pneumothorax. Trace left pleural effusion.  - No surgical intervention or tube placement due to size   - Monitor

## 2024-01-08 NOTE — H&P ADULT - NSHPPHYSICALEXAM_GEN_ALL_CORE
GENERAL: NAD, comfortable at bedside   HEAD:  Atraumatic, Normocephalic  EYES: EOMI, PERRL, conjunctiva and sclera clear  NECK: Supple, No JVD  CHEST/LUNG: Clear to auscultation bilaterally; No wheezes, rales or rhonchi. + TTP over left anterolateral ribs  HEART: Regular rate and rhythm; No murmurs, rubs, or gallops, (+)S1, S2  ABDOMEN: Soft, Nontender, Nondistended; Normal Bowel sounds   EXTREMITIES: L-shoulder - FROM, Tender. no deformities.   2+ Peripheral Pulses, No clubbing, cyanosis, or edema  PSYCH: normal mood and affect  NEUROLOGY: AAOx3, non-focal  SKIN: No rashes or lesions

## 2024-01-08 NOTE — ED PROVIDER NOTE - CARE PLAN
1 Principal Discharge DX:	Syncope  Secondary Diagnosis:	Fall on steps  Secondary Diagnosis:	Rib pain on left side  Secondary Diagnosis:	Left shoulder pain

## 2024-01-08 NOTE — ED ADULT TRIAGE NOTE - HEIGHT IN CM
170.18 Spiral Flap Text: The defect edges were debeveled with a #15 scalpel blade.  Given the location of the defect, shape of the defect and the proximity to free margins a spiral flap was deemed most appropriate.  Using a sterile surgical marker, an appropriate rotation flap was drawn incorporating the defect and placing the expected incisions within the relaxed skin tension lines where possible. The area thus outlined was incised deep to adipose tissue with a #15 scalpel blade.  The skin margins were undermined to an appropriate distance in all directions utilizing iris scissors.

## 2024-01-08 NOTE — CHART NOTE - NSCHARTNOTEFT_GEN_A_CORE
H&P reviewed. Pt feels well, still with R sided pain, worse with inspiration. Breath sounds are clear and full. Not in any respiratory distress. Pt believes he had a mechanical slip and fall but did lose consciousness but denies any headache or head strike. Carotid US reviewed; no significant findings. Rest of plan as detailed in H&P.

## 2024-01-08 NOTE — ED PROVIDER NOTE - OBJECTIVE STATEMENT
37 yo M s/p fall on steps with syncopal event.  Pt. last remembers walking up steps holding phone, then next remembers being driven to the ER tonight.  Pt. is not sure if he hit his head.  He believes he probably fell down 7-9 steps.  He complains of L shoulder and lateral L rib pain that hurts with deep breath.  No other complaints.    ROS: negative for fever, cough, headache, shortness of breath, abd pain, nausea, vomiting, diarrhea, rash, paresthesia, and weakness--all other systems reviewed are negative.   PMH: NIDDM; Meds: metformin; SH: Denies smoking/drinking/drug use

## 2024-01-08 NOTE — ED ADULT NURSE NOTE - NSFALLRISKINTERV_ED_ALL_ED
Communicate fall risk and risk factors to all staff, patient, and family/Provide visual cue: yellow wristband, yellow gown, etc/Reinforce activity limits and safety measures with patient and family/Call bell, personal items and telephone in reach/Instruct patient to call for assistance before getting out of bed/chair/stretcher/Non-slip footwear applied when patient is off stretcher/[place] to call system/Physically safe environment - no spills, clutter or unnecessary equipment/Purposeful Proactive Rounding/Room/bathroom lighting operational, light cord in reach Communicate fall risk and risk factors to all staff, patient, and family/Provide visual cue: yellow wristband, yellow gown, etc/Reinforce activity limits and safety measures with patient and family/Call bell, personal items and telephone in reach/Instruct patient to call for assistance before getting out of bed/chair/stretcher/Non-slip footwear applied when patient is off stretcher/[place] to call system/Physically safe environment - no spills, clutter or unnecessary equipment/Purposeful Proactive Rounding/Room/bathroom lighting operational, light cord in reach

## 2024-01-08 NOTE — ED PROVIDER NOTE - PROGRESS NOTE DETAILS
pt. is more comfortable after meds, tiny L pnx with 2 rib fractures, uncomplicated, not requiring chest tube at this time  pt. is agreeable with staying, will need further w/u for syncope

## 2024-01-08 NOTE — H&P ADULT - PATIENT'S GENDER IDENTITY
Pain, numbness of both hands.  Works in housekeeping  Will recommend Wrist Splint for Carpal-Tunnel Syndrome  BL at bed time.   Male

## 2024-01-09 VITALS
DIASTOLIC BLOOD PRESSURE: 72 MMHG | OXYGEN SATURATION: 98 % | TEMPERATURE: 99 F | HEART RATE: 84 BPM | RESPIRATION RATE: 18 BRPM | SYSTOLIC BLOOD PRESSURE: 117 MMHG

## 2024-01-09 NOTE — CONSULT NOTE ADULT - ASSESSMENT
ASSESSMENT: 39y/o M w/ PMHx DM presents s/p fall 1 day prior down 6-7 steps, no HS/LOC. [facility] imaging w/ acute L posterior 4th rib nondisplaced fx and displaced fracture more laterally (2 spots) and L posterior 5th rib nondisplaced fx w/ tiny L pneumothorax and trace L pleural effusion. Pulling ~2L on IS. Rib score 0.    PLAN:   - No acute trauma surgery intervention  - CXR w/ resolution of pneumothorax  - Multimodal pain control  - Incentive spirometry   - Ok for discharge from trauma perspective  - Dispo per ED      Patient discussed with trauma attending, Dr. Moody.    Fabby Red, PGY-2  ACS/Trauma  [phone]

## 2024-01-09 NOTE — CONSULT NOTE ADULT - SUBJECTIVE AND OBJECTIVE BOX
TRAUMA SERVICE (Acute Care Surgery / [facility] - #[id number]) - CONSULT NOTE  --------------------------------------------------------------------------------------------    TRAUMA ACTIVATION LEVEL: consult    MECHANISM OF INJURY:      [x] Blunt  	[] MVC	[x] Fall	[] Pedestrian Struck	[] Motorcycle accident      [] Penetrating  	[] Gun Shot Wound 		[] Stab Wound    GCS: 15 	E: 4	V: 5	M: 6      HPI:     37y/o M w/ PMHx DM presents s/p fall 1 day prior. Patient was on his phone at the top of steps and tripped on his slipper and fell 6-7 steps, landing on L side. Denies HS, LOC. Patient initially presents to [facility] where he reports he was in pain and overwhelmed at that time reports syncope and LOC; however, he now states that he did not lose consciousness or have a syncopal episode. Imaging at [facility] w/ acute L posterior 4th rib nondisplaced fx and displaced fracture more laterally (2 spots) and L posterior 5th rib nondisplaced fx w/ tiny L pneumothorax and trace L pleural effusion. CTH, CT Cspine negative, b/l carotid duplex normal. Patient left AMA from [facility] because he did not want to undergo further syncope workup but then presents to [facility] ED due to ongoing L sided chest wall pain. Denies nausea, vomiting, fevers, chills, SOB, HA, dizziness, lightheadedness. Pulling ~2L on IS.    Primary Survey:  A - airway intact  B - bilateral breath sounds and good chest rise  C - initial BP: 132/88 , HR: 89 , palpable pulses in all extremities  D - GCS 15 on arrival  Exposure obtained      Secondary Survey:  General: NAD  HEENT: Normocephalic, atraumatic, EOMI, PEERL.  Neck: Soft, midline trachea  Chest: L lateral chest wall tenderness  Cardiac: RRR  Respiratory: Bilateral breath sounds, clear and equal bilaterally  Abdomen: Soft, non-distended, non-tender, no rebound, no guarding, no masses palpated  Pelvis: Stable, non-tender, no ecchymosis  Ext: motor and sensory grossly intact in all 4 extremities  Back: no TTP, no palpable stepoff   Rectal: HERNAN deferred      ROS: 10-system review is otherwise negative except HPI above.      PAST MEDICAL & SURGICAL HISTORY:    FAMILY HISTORY:    [] Family history not pertinent as reviewed with the patient and family    SOCIAL HISTORY:     ALLERGIES: No Known Allergies      HOME MEDICATIONS:    CURRENT MEDICATIONS  MEDICATIONS (STANDING):   MEDICATIONS (PRN):  --------------------------------------------------------------------------------------------    Vitals:   T(C): 37.2 (01-09-24 @ 01:10), Max: 37.4 (01-08-24 @ 19:27)  HR: 84 (01-09-24 @ 01:10) (84 - 89)  BP: 117/72 (01-09-24 @ 01:10) (117/72 - 132/88)  RR: 18 (01-09-24 @ 01:10) (18 - 18)  SpO2: 98% (01-09-24 @ 01:10) (98% - 99%)  CAPILLARY BLOOD GLUCOSE        CAPILLARY BLOOD GLUCOSE          Height (cm): 170.2 (01-08 @ 19:27)  Weight (kg): 68 (01-08 @ 19:27)  BMI (kg/m2): 23.5 (01-08 @ 19:27)  BSA (m2): 1.79 (01-08 @ 19:27)  --------------------------------------------------------------------------------------------    LABS                --------------------------------------------------------------------------------------------    MICROBIOLOGY      --------------------------------------------------------------------------------------------    IMAGING  < from: Xray Chest 2 Views PA/Lat (01.08.24 @ 22:54) >  FINDINGS:  The heart is normal in size.  The lungs are clear.  There is no pneumothorax or pleural effusion.  Posterolateral left rib fracture with additional questionable   nondisplaced left lateral fifth rib fracture.    IMPRESSION:  Posterolateral left fourth rib fracture. Question additional nondisplaced   lateral left rib fifth fracture    No pneumothorax.    < end of copied text >      --------------------------------------------------------------------------------------------       TRAUMA SERVICE (Acute Care Surgery / [facility] - #[id number]) - CONSULT NOTE  --------------------------------------------------------------------------------------------    TRAUMA ACTIVATION LEVEL: consult    MECHANISM OF INJURY:      [x] Blunt  	[] MVC	[x] Fall	[] Pedestrian Struck	[] Motorcycle accident      [] Penetrating  	[] Gun Shot Wound 		[] Stab Wound    GCS: 15 	E: 4	V: 5	M: 6      HPI:     39y/o M w/ PMHx DM presents s/p fall 1 day prior. Patient was on his phone at the top of steps and tripped on his slipper and fell 6-7 steps, landing on L side. Denies HS, LOC. Patient initially presents to [facility] where he reports he was in pain and overwhelmed at that time reports syncope and LOC; however, he now states that he did not lose consciousness or have a syncopal episode. Imaging at [facility] w/ acute L posterior 4th rib nondisplaced fx and displaced fracture more laterally (2 spots) and L posterior 5th rib nondisplaced fx w/ tiny L pneumothorax and trace L pleural effusion. CTH, CT Cspine negative, b/l carotid duplex normal. Patient left AMA from [facility] because he did not want to undergo further syncope workup but then presents to [facility] ED due to ongoing L sided chest wall pain. Denies nausea, vomiting, fevers, chills, SOB, HA, dizziness, lightheadedness. Pulling ~2L on IS.    Primary Survey:  A - airway intact  B - bilateral breath sounds and good chest rise  C - initial BP: 132/88 , HR: 89 , palpable pulses in all extremities  D - GCS 15 on arrival  Exposure obtained      Secondary Survey:  General: NAD  HEENT: Normocephalic, atraumatic, EOMI, PEERL.  Neck: Soft, midline trachea  Chest: L lateral chest wall tenderness  Cardiac: RRR  Respiratory: Bilateral breath sounds, clear and equal bilaterally  Abdomen: Soft, non-distended, non-tender, no rebound, no guarding, no masses palpated  Pelvis: Stable, non-tender, no ecchymosis  Ext: motor and sensory grossly intact in all 4 extremities  Back: no TTP, no palpable stepoff   Rectal: HERNAN deferred      ROS: 10-system review is otherwise negative except HPI above.      PAST MEDICAL & SURGICAL HISTORY:    FAMILY HISTORY:    [] Family history not pertinent as reviewed with the patient and family    SOCIAL HISTORY:     ALLERGIES: No Known Allergies      HOME MEDICATIONS:    CURRENT MEDICATIONS  MEDICATIONS (STANDING):   MEDICATIONS (PRN):  --------------------------------------------------------------------------------------------    Vitals:   T(C): 37.2 (01-09-24 @ 01:10), Max: 37.4 (01-08-24 @ 19:27)  HR: 84 (01-09-24 @ 01:10) (84 - 89)  BP: 117/72 (01-09-24 @ 01:10) (117/72 - 132/88)  RR: 18 (01-09-24 @ 01:10) (18 - 18)  SpO2: 98% (01-09-24 @ 01:10) (98% - 99%)  CAPILLARY BLOOD GLUCOSE        CAPILLARY BLOOD GLUCOSE          Height (cm): 170.2 (01-08 @ 19:27)  Weight (kg): 68 (01-08 @ 19:27)  BMI (kg/m2): 23.5 (01-08 @ 19:27)  BSA (m2): 1.79 (01-08 @ 19:27)  --------------------------------------------------------------------------------------------    LABS                --------------------------------------------------------------------------------------------    MICROBIOLOGY      --------------------------------------------------------------------------------------------    IMAGING  < from: Xray Chest 2 Views PA/Lat (01.08.24 @ 22:54) >  FINDINGS:  The heart is normal in size.  The lungs are clear.  There is no pneumothorax or pleural effusion.  Posterolateral left rib fracture with additional questionable   nondisplaced left lateral fifth rib fracture.    IMPRESSION:  Posterolateral left fourth rib fracture. Question additional nondisplaced   lateral left rib fifth fracture    No pneumothorax.    < end of copied text >      --------------------------------------------------------------------------------------------

## 2024-01-17 DIAGNOSIS — S22.42XA MULTIPLE FRACTURES OF RIBS, LEFT SIDE, INITIAL ENCOUNTER FOR CLOSED FRACTURE: ICD-10-CM

## 2024-01-17 DIAGNOSIS — S27.0XXA TRAUMATIC PNEUMOTHORAX, INITIAL ENCOUNTER: ICD-10-CM

## 2024-01-17 DIAGNOSIS — Z79.84 LONG TERM (CURRENT) USE OF ORAL HYPOGLYCEMIC DRUGS: ICD-10-CM

## 2024-01-17 DIAGNOSIS — S27.321A CONTUSION OF LUNG, UNILATERAL, INITIAL ENCOUNTER: ICD-10-CM
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
## 2024-01-17 DIAGNOSIS — E11.9 TYPE 2 DIABETES MELLITUS WITHOUT COMPLICATIONS: ICD-10-CM

## 2024-01-17 DIAGNOSIS — R55 SYNCOPE AND COLLAPSE: ICD-10-CM
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
## 2025-09-03 ENCOUNTER — OUTPATIENT (OUTPATIENT)
Dept: OUTPATIENT SERVICES | Facility: HOSPITAL | Age: 40
LOS: 1 days | End: 2025-09-03
Payer: COMMERCIAL

## 2025-09-03 VITALS
RESPIRATION RATE: 16 BRPM | SYSTOLIC BLOOD PRESSURE: 137 MMHG | OXYGEN SATURATION: 99 % | TEMPERATURE: 98 F | HEART RATE: 93 BPM | WEIGHT: 158.07 LBS | DIASTOLIC BLOOD PRESSURE: 92 MMHG

## 2025-09-03 DIAGNOSIS — M65.332 TRIGGER FINGER, LEFT MIDDLE FINGER: ICD-10-CM

## 2025-09-03 DIAGNOSIS — M65.342 TRIGGER FINGER, LEFT RING FINGER: ICD-10-CM

## 2025-09-03 DIAGNOSIS — Z01.818 ENCOUNTER FOR OTHER PREPROCEDURAL EXAMINATION: ICD-10-CM

## 2025-09-03 DIAGNOSIS — E11.9 TYPE 2 DIABETES MELLITUS WITHOUT COMPLICATIONS: ICD-10-CM

## 2025-09-03 LAB
ALBUMIN SERPL ELPH-MCNC: 4.3 G/DL — SIGNIFICANT CHANGE UP (ref 3.3–5)
ALP SERPL-CCNC: 54 U/L — SIGNIFICANT CHANGE UP (ref 40–120)
ALT FLD-CCNC: 43 U/L — SIGNIFICANT CHANGE UP (ref 12–78)
ANION GAP SERPL CALC-SCNC: 4 MMOL/L — LOW (ref 5–17)
AST SERPL-CCNC: 24 U/L — SIGNIFICANT CHANGE UP (ref 15–37)
BILIRUB SERPL-MCNC: 0.3 MG/DL — SIGNIFICANT CHANGE UP (ref 0.2–1.2)
BUN SERPL-MCNC: 15 MG/DL — SIGNIFICANT CHANGE UP (ref 7–23)
CALCIUM SERPL-MCNC: 10 MG/DL — SIGNIFICANT CHANGE UP (ref 8.5–10.1)
CHLORIDE SERPL-SCNC: 104 MMOL/L — SIGNIFICANT CHANGE UP (ref 96–108)
CO2 SERPL-SCNC: 29 MMOL/L — SIGNIFICANT CHANGE UP (ref 22–31)
CREAT SERPL-MCNC: 0.88 MG/DL — SIGNIFICANT CHANGE UP (ref 0.5–1.3)
EGFR: 111 ML/MIN/1.73M2 — SIGNIFICANT CHANGE UP
EGFR: 111 ML/MIN/1.73M2 — SIGNIFICANT CHANGE UP
GLUCOSE SERPL-MCNC: 126 MG/DL — HIGH (ref 70–99)
HCT VFR BLD CALC: 40.4 % — SIGNIFICANT CHANGE UP (ref 39–50)
HGB BLD-MCNC: 13.6 G/DL — SIGNIFICANT CHANGE UP (ref 13–17)
MCHC RBC-ENTMCNC: 29.7 PG — SIGNIFICANT CHANGE UP (ref 27–34)
MCHC RBC-ENTMCNC: 33.7 G/DL — SIGNIFICANT CHANGE UP (ref 32–36)
MCV RBC AUTO: 88.2 FL — SIGNIFICANT CHANGE UP (ref 80–100)
NRBC # BLD AUTO: 0 K/UL — SIGNIFICANT CHANGE UP (ref 0–0)
NRBC # FLD: 0 K/UL — SIGNIFICANT CHANGE UP (ref 0–0)
NRBC BLD AUTO-RTO: 0 /100 WBCS — SIGNIFICANT CHANGE UP (ref 0–0)
PLATELET # BLD AUTO: 227 K/UL — SIGNIFICANT CHANGE UP (ref 150–400)
PMV BLD: 10.6 FL — SIGNIFICANT CHANGE UP (ref 7–13)
POTASSIUM SERPL-MCNC: 3.8 MMOL/L — SIGNIFICANT CHANGE UP (ref 3.5–5.3)
POTASSIUM SERPL-SCNC: 3.8 MMOL/L — SIGNIFICANT CHANGE UP (ref 3.5–5.3)
PROT SERPL-MCNC: 8.2 G/DL — SIGNIFICANT CHANGE UP (ref 6–8.3)
RBC # BLD: 4.58 M/UL — SIGNIFICANT CHANGE UP (ref 4.2–5.8)
RBC # FLD: 13.3 % — SIGNIFICANT CHANGE UP (ref 10.3–14.5)
SODIUM SERPL-SCNC: 137 MMOL/L — SIGNIFICANT CHANGE UP (ref 135–145)
WBC # BLD: 5.06 K/UL — SIGNIFICANT CHANGE UP (ref 3.8–10.5)
WBC # FLD AUTO: 5.06 K/UL — SIGNIFICANT CHANGE UP (ref 3.8–10.5)

## 2025-09-03 PROCEDURE — 80053 COMPREHEN METABOLIC PANEL: CPT

## 2025-09-03 PROCEDURE — 36415 COLL VENOUS BLD VENIPUNCTURE: CPT

## 2025-09-03 PROCEDURE — 85027 COMPLETE CBC AUTOMATED: CPT

## 2025-09-03 PROCEDURE — 93010 ELECTROCARDIOGRAM REPORT: CPT

## 2025-09-03 PROCEDURE — G0463: CPT

## 2025-09-04 LAB
A1C WITH ESTIMATED AVERAGE GLUCOSE RESULT: 6.6 % — HIGH (ref 4–5.6)
ESTIMATED AVERAGE GLUCOSE: 143 MG/DL — HIGH (ref 68–114)

## 2025-09-10 RX ORDER — ROSUVASTATIN CALCIUM 20 MG/1
1 TABLET, FILM COATED ORAL
Refills: 0 | DISCHARGE

## 2025-09-10 RX ORDER — METFORMIN HYDROCHLORIDE 850 MG/1
1 TABLET ORAL
Refills: 0 | DISCHARGE